# Patient Record
Sex: FEMALE | Race: BLACK OR AFRICAN AMERICAN | Employment: OTHER | URBAN - METROPOLITAN AREA
[De-identification: names, ages, dates, MRNs, and addresses within clinical notes are randomized per-mention and may not be internally consistent; named-entity substitution may affect disease eponyms.]

---

## 2022-04-30 ENCOUNTER — HOSPITAL ENCOUNTER (OUTPATIENT)
Age: 67
Setting detail: OBSERVATION
Discharge: HOME | End: 2022-05-01
Attending: STUDENT IN AN ORGANIZED HEALTH CARE EDUCATION/TRAINING PROGRAM | Admitting: HOSPITALIST
Payer: MEDICARE

## 2022-04-30 DIAGNOSIS — T78.3XXA ANGIOEDEMA, INITIAL ENCOUNTER: Primary | ICD-10-CM

## 2022-04-30 LAB
ANION GAP SERPL CALC-SCNC: 8 MMOL/L (ref 3–18)
BASOPHILS # BLD: 0 K/UL (ref 0–0.1)
BASOPHILS NFR BLD: 0 % (ref 0–2)
BUN SERPL-MCNC: 12 MG/DL (ref 7–18)
BUN/CREAT SERPL: 13 (ref 12–20)
CALCIUM SERPL-MCNC: 9.1 MG/DL (ref 8.5–10.1)
CHLORIDE SERPL-SCNC: 108 MMOL/L (ref 100–111)
CO2 SERPL-SCNC: 28 MMOL/L (ref 21–32)
CREAT SERPL-MCNC: 0.9 MG/DL (ref 0.6–1.3)
DIFFERENTIAL METHOD BLD: NORMAL
EOSINOPHIL # BLD: 0 K/UL (ref 0–0.4)
EOSINOPHIL NFR BLD: 0 % (ref 0–5)
ERYTHROCYTE [DISTWIDTH] IN BLOOD BY AUTOMATED COUNT: 13.6 % (ref 11.6–14.5)
GLUCOSE SERPL-MCNC: 108 MG/DL (ref 74–99)
HCT VFR BLD AUTO: 41.4 % (ref 35–45)
HGB BLD-MCNC: 13 G/DL (ref 12–16)
IMM GRANULOCYTES # BLD AUTO: 0 K/UL (ref 0–0.04)
IMM GRANULOCYTES NFR BLD AUTO: 0 % (ref 0–0.5)
LYMPHOCYTES # BLD: 2.1 K/UL (ref 0.9–3.6)
LYMPHOCYTES NFR BLD: 33 % (ref 21–52)
MAGNESIUM SERPL-MCNC: 2.2 MG/DL (ref 1.6–2.6)
MCH RBC QN AUTO: 29.3 PG (ref 24–34)
MCHC RBC AUTO-ENTMCNC: 31.4 G/DL (ref 31–37)
MCV RBC AUTO: 93.2 FL (ref 78–100)
MONOCYTES # BLD: 0.5 K/UL (ref 0.05–1.2)
MONOCYTES NFR BLD: 8 % (ref 3–10)
NEUTS SEG # BLD: 3.7 K/UL (ref 1.8–8)
NEUTS SEG NFR BLD: 59 % (ref 40–73)
NRBC # BLD: 0 K/UL (ref 0–0.01)
NRBC BLD-RTO: 0 PER 100 WBC
PLATELET # BLD AUTO: 195 K/UL (ref 135–420)
PMV BLD AUTO: 11.6 FL (ref 9.2–11.8)
POTASSIUM SERPL-SCNC: 3.5 MMOL/L (ref 3.5–5.5)
RBC # BLD AUTO: 4.44 M/UL (ref 4.2–5.3)
SODIUM SERPL-SCNC: 144 MMOL/L (ref 136–145)
WBC # BLD AUTO: 6.4 K/UL (ref 4.6–13.2)

## 2022-04-30 PROCEDURE — 74011000250 HC RX REV CODE- 250: Performed by: HOSPITALIST

## 2022-04-30 PROCEDURE — 80048 BASIC METABOLIC PNL TOTAL CA: CPT

## 2022-04-30 PROCEDURE — 65390000012 HC CONDITION CODE 44 OBSERVATION

## 2022-04-30 PROCEDURE — 74011250636 HC RX REV CODE- 250/636: Performed by: EMERGENCY MEDICINE

## 2022-04-30 PROCEDURE — 99285 EMERGENCY DEPT VISIT HI MDM: CPT

## 2022-04-30 PROCEDURE — 74011250636 HC RX REV CODE- 250/636: Performed by: STUDENT IN AN ORGANIZED HEALTH CARE EDUCATION/TRAINING PROGRAM

## 2022-04-30 PROCEDURE — 99219 PR INITIAL OBSERVATION CARE/DAY 50 MINUTES: CPT | Performed by: HOSPITALIST

## 2022-04-30 PROCEDURE — 96374 THER/PROPH/DIAG INJ IV PUSH: CPT

## 2022-04-30 PROCEDURE — 74011000250 HC RX REV CODE- 250: Performed by: STUDENT IN AN ORGANIZED HEALTH CARE EDUCATION/TRAINING PROGRAM

## 2022-04-30 PROCEDURE — 83735 ASSAY OF MAGNESIUM: CPT

## 2022-04-30 PROCEDURE — G0378 HOSPITAL OBSERVATION PER HR: HCPCS

## 2022-04-30 PROCEDURE — 74011000258 HC RX REV CODE- 258: Performed by: STUDENT IN AN ORGANIZED HEALTH CARE EDUCATION/TRAINING PROGRAM

## 2022-04-30 PROCEDURE — 74011250636 HC RX REV CODE- 250/636: Performed by: OTOLARYNGOLOGY

## 2022-04-30 PROCEDURE — 85025 COMPLETE CBC W/AUTO DIFF WBC: CPT

## 2022-04-30 PROCEDURE — 96375 TX/PRO/DX INJ NEW DRUG ADDON: CPT

## 2022-04-30 RX ORDER — DEXTROSE, SODIUM CHLORIDE, AND POTASSIUM CHLORIDE 5; .9; .15 G/100ML; G/100ML; G/100ML
75 INJECTION INTRAVENOUS CONTINUOUS
Status: DISCONTINUED | OUTPATIENT
Start: 2022-04-30 | End: 2022-05-01 | Stop reason: HOSPADM

## 2022-04-30 RX ORDER — ONDANSETRON 2 MG/ML
4 INJECTION INTRAMUSCULAR; INTRAVENOUS
Status: DISCONTINUED | OUTPATIENT
Start: 2022-04-30 | End: 2022-05-01 | Stop reason: HOSPADM

## 2022-04-30 RX ORDER — ONDANSETRON 8 MG/1
4 TABLET, ORALLY DISINTEGRATING ORAL
Status: DISCONTINUED | OUTPATIENT
Start: 2022-04-30 | End: 2022-05-01 | Stop reason: HOSPADM

## 2022-04-30 RX ORDER — ACETAMINOPHEN 325 MG/1
650 TABLET ORAL
Status: DISCONTINUED | OUTPATIENT
Start: 2022-04-30 | End: 2022-05-01 | Stop reason: HOSPADM

## 2022-04-30 RX ORDER — POLYETHYLENE GLYCOL 3350 17 G/17G
17 POWDER, FOR SOLUTION ORAL DAILY PRN
Status: DISCONTINUED | OUTPATIENT
Start: 2022-04-30 | End: 2022-05-01 | Stop reason: HOSPADM

## 2022-04-30 RX ORDER — SODIUM CHLORIDE 0.9 % (FLUSH) 0.9 %
5-40 SYRINGE (ML) INJECTION EVERY 8 HOURS
Status: DISCONTINUED | OUTPATIENT
Start: 2022-04-30 | End: 2022-05-01 | Stop reason: HOSPADM

## 2022-04-30 RX ORDER — SODIUM CHLORIDE 0.9 % (FLUSH) 0.9 %
5-40 SYRINGE (ML) INJECTION AS NEEDED
Status: DISCONTINUED | OUTPATIENT
Start: 2022-04-30 | End: 2022-05-01 | Stop reason: HOSPADM

## 2022-04-30 RX ORDER — FAMOTIDINE 10 MG/ML
20 INJECTION INTRAVENOUS DAILY
Status: DISCONTINUED | OUTPATIENT
Start: 2022-05-01 | End: 2022-05-01 | Stop reason: HOSPADM

## 2022-04-30 RX ORDER — DEXAMETHASONE SODIUM PHOSPHATE 4 MG/ML
10 INJECTION, SOLUTION INTRA-ARTICULAR; INTRALESIONAL; INTRAMUSCULAR; INTRAVENOUS; SOFT TISSUE EVERY 6 HOURS
Status: DISCONTINUED | OUTPATIENT
Start: 2022-04-30 | End: 2022-05-01

## 2022-04-30 RX ORDER — ACETAMINOPHEN 650 MG/1
650 SUPPOSITORY RECTAL
Status: DISCONTINUED | OUTPATIENT
Start: 2022-04-30 | End: 2022-05-01 | Stop reason: HOSPADM

## 2022-04-30 RX ORDER — DIPHENHYDRAMINE HYDROCHLORIDE 50 MG/ML
25 INJECTION, SOLUTION INTRAMUSCULAR; INTRAVENOUS
Status: DISCONTINUED | OUTPATIENT
Start: 2022-04-30 | End: 2022-05-01 | Stop reason: HOSPADM

## 2022-04-30 RX ADMIN — TRANEXAMIC ACID 1000 MG: 1 INJECTION, SOLUTION INTRAVENOUS at 12:15

## 2022-04-30 RX ADMIN — POTASSIUM CHLORIDE, DEXTROSE MONOHYDRATE AND SODIUM CHLORIDE 75 ML/HR: 150; 5; 900 INJECTION, SOLUTION INTRAVENOUS at 20:52

## 2022-04-30 RX ADMIN — SODIUM CHLORIDE, PRESERVATIVE FREE 10 ML: 5 INJECTION INTRAVENOUS at 22:08

## 2022-04-30 RX ADMIN — FAMOTIDINE 20 MG: 10 INJECTION INTRAVENOUS at 12:05

## 2022-04-30 RX ADMIN — DEXAMETHASONE SODIUM PHOSPHATE 10 MG: 4 INJECTION, SOLUTION INTRAMUSCULAR; INTRAVENOUS at 18:18

## 2022-04-30 NOTE — ED NOTES
Informed about patient arriving to the ED from [facility] by Dr. Keegan Ewing . Dr. Keegan Ewing  received report by Dr. Sophie Gore about patient having an allergic reaction after consuming shrimp yesterday. Patient received epinephrine, Benadryl, [facility] Received tranexamic acid, received Pepcid. Patient states that she ate shrimp yesterday night and she woke up today with difficulty swallowing, tongue swelling. Patient feels better compared to this morning. Reports there is moderate tongue swelling noted not able to visualize the uvula, submental swelling. No drooling.   No wheezing  1:56 PM  Reassessed   Patient not in distress, no drooling, tongue swelling persists, unable to visualize uvula

## 2022-04-30 NOTE — ED NOTES
[facility] at bedside preparing patient for transport to [facility] ED. All patient belongings transported with patient at this time. Tranexamic acid still infusing at time of transport. bilateral LE Active ROM was WNL (within normal limits)

## 2022-04-30 NOTE — ED PROVIDER NOTES
EMERGENCY DEPARTMENT HISTORY AND PHYSICAL EXAM      Date: 4/30/2022  Patient Name: Mayelin Powell    History of Presenting Illness     Chief Complaint   Patient presents with    Allergic Reaction         History Provided By: patient     Chief Complaint: tongue swelling  Duration: 4 hours  Timing: started at 9AM  Location: tongue    Severity: moderate  Modifying Factors: thinks exposure to shrimp the preceding evening  Associated Symptoms: none      History (Context): Mayelin Powell is a 75 y.o. female with a past medical history significant for HTN, recent episode of angioedema 1 month ago that she attributed to strep exposure, comes into the ED today due to acute onset angioedema that started this morning at 9 AM.  She reports having had a strep test yesterday evening. No symptoms at that time. No known triggers before the angioedema started today. Reports swelling in her tongue with no other associated structures. No associated nausea/vomiting, abdominal pain, diarrhea, dizziness/lightheadedness, hives, rash. Went to urgent care and received Solu-Medrol, Benadryl, epinephrine. She was then transported to [facility] where she received TXA and Pepcid. Presents to [facility] emergency department for further management. Currently denies headache, vision changes, hearing loss, abdominal pain, nausea/vomiting, lightheadedness. Review of systems otherwise negative. PCP: Rehana Whitehead MD    Current Outpatient Medications   Medication Sig Dispense Refill    hydroCHLOROthiazide (MICROZIDE) 12.5 mg capsule Take 12.5 mg by mouth daily.          Past History     Past Medical History:   Past Medical History:   Diagnosis Date    Hyperlipemia     Hypertension     Vitamin D deficiency        Past Surgical History:  Past Surgical History:   Procedure Laterality Date    HX BACK SURGERY      Left L5-S1 minimally invasive diskectomy/decompression    HX HYSTERECTOMY      HX ORTHOPAEDIC Right     Jing Anabela Quervain's tenosynovitis release       Family History:  History reviewed. No pertinent family history. Social History:   Social History     Tobacco Use    Smoking status: Never Smoker    Smokeless tobacco: Never Used   Vaping Use    Vaping Use: Never used   Substance Use Topics    Alcohol use: Yes     Comment: social     Drug use: No       Allergies: Allergies   Allergen Reactions    Penicillins Swelling     Arm swelling       PMH, PSH, family history, social history, allergies reviewed with the patient with significant items noted above. Review of Systems   Review of Systems   Constitutional: Negative for chills and fever. HENT: Negative for hearing loss, rhinorrhea and sore throat. Lingual swelling   Eyes: Negative for visual disturbance. Respiratory: Negative for shortness of breath. Cardiovascular: Negative for chest pain. Gastrointestinal: Negative for abdominal pain, nausea and vomiting. Genitourinary: Negative for difficulty urinating, dysuria and hematuria. Musculoskeletal: Negative for myalgias and neck pain. Skin: Negative for rash. Neurological: Negative for dizziness, syncope, light-headedness and headaches. Physical Exam     Vitals:    04/30/22 1257 04/30/22 1330 04/30/22 1345 04/30/22 1545   BP: (!) 142/78 134/78 (!) 141/77 (!) 180/94   Pulse:  79 78 95   Resp: 18 19 20 (!) 43   Temp: 97.8 °F (36.6 °C)      SpO2: 100% 94% 94% 90%   Weight:       Height:           Physical Exam  Vitals and nursing note reviewed. Constitutional:       General: She is not in acute distress. Appearance: Normal appearance. She is obese. She is not ill-appearing, toxic-appearing or diaphoretic. HENT:      Head: Normocephalic and atraumatic. Right Ear: External ear normal.      Left Ear: External ear normal.      Nose: Nose normal.      Mouth/Throat:      Mouth: Mucous membranes are moist.      Comments: Swollen tongue, patent airway.   Unable to appreciate the uvula or oropharynx  Eyes:      Extraocular Movements: Extraocular movements intact. Cardiovascular:      Rate and Rhythm: Normal rate and regular rhythm. Pulses: Normal pulses. Heart sounds: Normal heart sounds. No murmur heard. No gallop. Pulmonary:      Effort: Pulmonary effort is normal. No respiratory distress. Breath sounds: Normal breath sounds. No stridor. No wheezing, rhonchi or rales. Abdominal:      General: Abdomen is flat. Bowel sounds are normal.      Palpations: Abdomen is soft. Tenderness: There is no abdominal tenderness. There is no guarding or rebound. Musculoskeletal:         General: No swelling or tenderness. Cervical back: Neck supple. Right lower leg: No edema. Left lower leg: No edema. Skin:     General: Skin is warm and dry. Capillary Refill: Capillary refill takes less than 2 seconds. Findings: No lesion or rash. Neurological:      General: No focal deficit present. Mental Status: She is alert and oriented to person, place, and time. Mental status is at baseline. Psychiatric:         Mood and Affect: Mood normal.         Diagnostic Study Results     Labs -     Recent Results (from the past 12 hour(s))   CBC WITH AUTOMATED DIFF    Collection Time: 04/30/22 12:03 PM   Result Value Ref Range    WBC 6.4 4.6 - 13.2 K/uL    RBC 4.44 4.20 - 5.30 M/uL    HGB 13.0 12.0 - 16.0 g/dL    HCT 41.4 35.0 - 45.0 %    MCV 93.2 78.0 - 100.0 FL    MCH 29.3 24.0 - 34.0 PG    MCHC 31.4 31.0 - 37.0 g/dL    RDW 13.6 11.6 - 14.5 %    PLATELET 901 883 - 826 K/uL    MPV 11.6 9.2 - 11.8 FL    NRBC 0.0 0  WBC    ABSOLUTE NRBC 0.00 0.00 - 0.01 K/uL    NEUTROPHILS 59 40 - 73 %    LYMPHOCYTES 33 21 - 52 %    MONOCYTES 8 3 - 10 %    EOSINOPHILS 0 0 - 5 %    BASOPHILS 0 0 - 2 %    IMMATURE GRANULOCYTES 0 0.0 - 0.5 %    ABS. NEUTROPHILS 3.7 1.8 - 8.0 K/UL    ABS. LYMPHOCYTES 2.1 0.9 - 3.6 K/UL    ABS. MONOCYTES 0.5 0.05 - 1.2 K/UL    ABS.  EOSINOPHILS 0.0 0.0 - 0.4 K/UL    ABS. BASOPHILS 0.0 0.0 - 0.1 K/UL    ABS. IMM. GRANS. 0.0 0.00 - 0.04 K/UL    DF AUTOMATED     METABOLIC PANEL, BASIC    Collection Time: 04/30/22 12:03 PM   Result Value Ref Range    Sodium 144 136 - 145 mmol/L    Potassium 3.5 3.5 - 5.5 mmol/L    Chloride 108 100 - 111 mmol/L    CO2 28 21 - 32 mmol/L    Anion gap 8 3.0 - 18 mmol/L    Glucose 108 (H) 74 - 99 mg/dL    BUN 12 7.0 - 18 MG/DL    Creatinine 0.90 0.6 - 1.3 MG/DL    BUN/Creatinine ratio 13 12 - 20      GFR est AA >60 >60 ml/min/1.73m2    GFR est non-AA >60 >60 ml/min/1.73m2    Calcium 9.1 8.5 - 10.1 MG/DL   MAGNESIUM    Collection Time: 04/30/22 12:03 PM   Result Value Ref Range    Magnesium 2.2 1.6 - 2.6 mg/dL      Labs Reviewed   METABOLIC PANEL, BASIC - Abnormal; Notable for the following components:       Result Value    Glucose 108 (*)     All other components within normal limits   CBC WITH AUTOMATED DIFF   MAGNESIUM       Radiologic Studies -   No orders to display     CT Results  (Last 48 hours)    None        CXR Results  (Last 48 hours)    None          The laboratory results, imaging results, and other diagnostic exams were reviewed in the EMR. Medical Decision Making   I am the first provider for this patient. I reviewed the vital signs, available nursing notes, past medical history, past surgical history, family history and social history. Vital Signs-Reviewed the patient's vital signs. Records Reviewed: Personally, on initial evaluation    MDM:   Brynn Drivers presents with complaint of angioedema  DDX includes but is not limited to: Anaphylaxis, angioedema with multiple etiologies (hereditary, acquired, ACE-i induced), Johnny's angina, PTA    Patient presents in no acute distress. She is maintaining her airway and has no audible stridor. She reports that the swelling seems improved compared to before.   Of note, ICU was contacted prior to patient's arrival.  Plan is for  assessment and further recommendations from ENT, Dr. Stephany Tucker. Dr. Stephany Tucker assessed the patient with nasopharyngeal scope. At this time, patient continues to have some swelling in the oropharynx. He recommended continued observation in the inpatient setting. Per his recommendation, ICU was not necessary but patient may benefit from stepdown disposition. Case discussed with hospitalist, Dr. Summer Luciano, for continued observation. Patient evaluated by hospitalist and admitted to stepdown for continued airway watch. Patient was stable upon admission to SDU      Orders as below:  Orders Placed This Encounter    CBC WITH AUTOMATED DIFF    BASIC METABOLIC PANEL    MAGNESIUM    CARDIAC MONITORING    SALINE LOCK IV ONE TIME STAT    famotidine (PF) (PEPCID) 20 mg in 0.9% sodium chloride 10 mL injection    tranexamic acid (CYKLOKAPRON) 1,000 mg in 0.9% sodium chloride (MBP/ADV) 110 mL MBP    INITIAL PHYSICIAN ORDER: INPATIENT Stepdown; Yes; 3. Patient receiving treatment that can only be provided in an inpatient setting (further clarification in H&P documentation)        Procedures:  Procedures        Diagnosis and Disposition     CLINICAL IMPRESSION:  1. Angioedema, initial encounter      Current Discharge Medication List          Disposition: admit - SDU    Patient condition at time of disposition: stable        Dragon Disclaimer     Please note that this dictation was completed with Launch[employer]ram, the computer voice recognition software. Quite often unanticipated grammatical, syntax, homophones, and other interpretive errors are inadvertently transcribed by the computer software. Please disregard these errors. Please excuse any errors that have escaped final proofreading.       Savannah Feliciano MD

## 2022-04-30 NOTE — ED TRIAGE NOTES
EMS states \"patient ate shrimp last night and she woke up this morning and her tongue was swollen\". EMS states \"[facility]ient [facility] and [facility] benadryl IM, Epi 1 mg, Solu-medrol 125 IV by IV.

## 2022-04-30 NOTE — ED NOTES
TRANSFER - OUT REPORT:    Verbal report given to [address] [place] [address] on Elisa Rebolledo  being transferred to [facility] ED for routine progression of care       Report consisted of patients Situation, Background, Assessment and   Recommendations(SBAR). Information from the following report(s) SBAR, ED Summary and MAR was reviewed with the receiving nurse. Lines:   Peripheral IV 04/30/22 Right Hand (Active)   Site Assessment Clean, dry, & intact 04/30/22 1155   Phlebitis Assessment 0 04/30/22 1155   Infiltration Assessment 0 04/30/22 1155   Dressing Status Clean, dry, & intact 04/30/22 1155   Dressing Type Transparent 04/30/22 1155   Hub Color/Line Status Pink;Patent; Flushed 04/30/22 1155       Peripheral IV 04/30/22 Left Hand (Active)   Site Assessment Clean, dry, & intact 04/30/22 1204   Phlebitis Assessment 0 04/30/22 1204   Infiltration Assessment 0 04/30/22 1204   Dressing Status Clean, dry, & intact 04/30/22 1204   Dressing Type Transparent 04/30/22 1204   Hub Color/Line Status Blue;Patent; Flushed 04/30/22 1204        Opportunity for questions and clarification was provided.

## 2022-04-30 NOTE — Clinical Note
Status[de-identified] INPATIENT [101]   Type of Bed: Stepdown [17]   Cardiac Monitoring Required?: Yes   Inpatient Hospitalization Certified Necessary for the Following Reasons: 3.  Patient receiving treatment that can only be provided in an inpatient setting (further clarification in H&P documentation)   Admitting Diagnosis: Angioedema [269603]   Admitting Physician: Caleb Collazo [[id number]]   Attending Physician: Caleb Collazo [[id number]]   Estimated Length of Stay: 2 Midnights   Discharge Plan[de-identified] Home with Office Follow-up

## 2022-04-30 NOTE — H&P
HISTORY & PHYSICAL      Patient: Catrachito Brooke MRN: [id number]  [facility]: [id number]    YOB: 1955  Age: 75 y.o. Sex: female    DOA: 4/30/2022 LOS:  LOS: 1 day        DOA: 4/30/2022        Assessment/Plan     Active Problems:    Angioedema (4/30/2022)        Patient Active Problem List   Diagnosis Code    Angioedema T78. 3XXA         Plan:  1. Angioedema secondary to shrimp allergy -patient mentions she had shrimp and crept up yesterday. IV steroids, IV Pepcid, IV Benadryl, patient evaluated by ENT and recommended overnight observation. 2. History of hypertension -holding HCTZ, can restart on discharge. DVT prophylaxisSCDs  Full code              HPI:     Catrachito Brooke is a 75 y.o. female who has history of hypertension, shrimp allergy, presents to the emergency room after having shrimp and crab dip yesterday. Patient mentions that she forgot that she was allergic. She noticed her tongue swelling as well as difficulty swallowing early this morning so came to the emergency room for further evaluation. She does not have an EpiPen at home. ER evaluationpatient noted to be short of breath, started on IV steroids, IV Benadryl as well as IV Pepcid. Patient evaluated by ENT in the ER and recommended overnight observation. Past Medical History:   Diagnosis Date    Hyperlipemia     Hypertension     Vitamin D deficiency        Past Surgical History:   Procedure Laterality Date    HX BACK SURGERY      Left L5-S1 minimally invasive diskectomy/decompression    HX HYSTERECTOMY      HX ORTHOPAEDIC Right     De Quervain's tenosynovitis release       History reviewed. No pertinent family history.     Social History     Socioeconomic History    Marital status: SINGLE   Tobacco Use    Smoking status: Never Smoker    Smokeless tobacco: Never Used   Vaping Use    Vaping Use: Never used   Substance and Sexual Activity    Alcohol use: Yes     Comment: social     Drug use: No    Sexual activity: Not Currently       Prior to Admission medications    Medication Sig Start Date End Date Taking? Authorizing Provider   hydroCHLOROthiazide (MICROZIDE) 12.5 mg capsule Take 12.5 mg by mouth daily. Yes Other, MD Julia       Allergies   Allergen Reactions    Penicillins Swelling     Arm swelling       Review of Systems:    Pertinent Positives noted in HPI. Rest all other ROS were noted to be negative. Physical Exam:      Visit Vitals  BP (!) 180/94   Pulse 95   Temp 97.8 °F (36.6 °C)   Resp (!) 43   Ht 5' 2\" (1.575 m)   Wt 87.5 kg (193 lb)   SpO2 90%   BMI 35.30 kg/m²       Physical Exam:    Gen: In general, this is a well nourished female in no acute distress  HEENT: Sclerae nonicteric. Oral mucous membranes moist. Dentition normal, tongue does not appear to be swollen at this time. Patient does not have any airway compromise. Neck: Supple with midline trachea. CV: RRR without murmur or rub appreciated. Resp:Respirations are unlabored without use of accessory muscles. Lung fields B/L without wheezes or rhonchi. Abd: Soft, nontender, nondistended. Extrem: Extremities are warm, without cyanosis or clubbing. No pitting pretibial edema. Skin: Warm, no visible rashes. Neuro: Patient is alert, oriented, and cooperative. No obvious focal defects. Moves all 4 extremities.     Labs Reviewed:    Recent Results (from the past 24 hour(s))   CBC WITH AUTOMATED DIFF    Collection Time: 04/30/22 12:03 PM   Result Value Ref Range    WBC 6.4 4.6 - 13.2 K/uL    RBC 4.44 4.20 - 5.30 M/uL    HGB 13.0 12.0 - 16.0 g/dL    HCT 41.4 35.0 - 45.0 %    MCV 93.2 78.0 - 100.0 FL    MCH 29.3 24.0 - 34.0 PG    MCHC 31.4 31.0 - 37.0 g/dL    RDW 13.6 11.6 - 14.5 %    PLATELET 373 197 - 249 K/uL    MPV 11.6 9.2 - 11.8 FL    NRBC 0.0 0  WBC    ABSOLUTE NRBC 0.00 0.00 - 0.01 K/uL    NEUTROPHILS 59 40 - 73 %    LYMPHOCYTES 33 21 - 52 %    MONOCYTES 8 3 - 10 %    EOSINOPHILS 0 0 - 5 %    BASOPHILS 0 0 - 2 %    IMMATURE GRANULOCYTES 0 0.0 - 0.5 %    ABS. NEUTROPHILS 3.7 1.8 - 8.0 K/UL    ABS. LYMPHOCYTES 2.1 0.9 - 3.6 K/UL    ABS. MONOCYTES 0.5 0.05 - 1.2 K/UL    ABS. EOSINOPHILS 0.0 0.0 - 0.4 K/UL    ABS. BASOPHILS 0.0 0.0 - 0.1 K/UL    ABS. IMM. GRANS. 0.0 0.00 - 0.04 K/UL    DF AUTOMATED     METABOLIC PANEL, BASIC    Collection Time: 04/30/22 12:03 PM   Result Value Ref Range    Sodium 144 136 - 145 mmol/L    Potassium 3.5 3.5 - 5.5 mmol/L    Chloride 108 100 - 111 mmol/L    CO2 28 21 - 32 mmol/L    Anion gap 8 3.0 - 18 mmol/L    Glucose 108 (H) 74 - 99 mg/dL    BUN 12 7.0 - 18 MG/DL    Creatinine 0.90 0.6 - 1.3 MG/DL    BUN/Creatinine ratio 13 12 - 20      GFR est AA >60 >60 ml/min/1.73m2    GFR est non-AA >60 >60 ml/min/1.73m2    Calcium 9.1 8.5 - 10.1 MG/DL   MAGNESIUM    Collection Time: 04/30/22 12:03 PM   Result Value Ref Range    Magnesium 2.2 1.6 - 2.6 mg/dL       Imaging Reviewed:    XR Results (most recent):  Results from Hospital Encounter encounter on 12/16/20    XR CHEST SNGL V    Narrative  Exam: AP portable chest    Clinical indication: Cough, Covid 19 exposure    Comparison: 11/10/2010; Results:  No consolidation. No pleural effusions. No lung  masses. No pneumothorax. Heart mildly enlarged. Mediastinal contours are normal.    No free air is seen under the hemidiaphragms. Osseous structures intact. Impression  IMPRESSION:  No acute disease. CT Results (most recent):  No results found for this or any previous visit. Yossi Vogel MD  4/30/2022, 5:47 PM        Disclaimer: Sections of this note are dictated using utilizing voice recognition software. Minor typographical errors may be present. If questions arise, please do not hesitate to contact me or call our department.

## 2022-04-30 NOTE — ED NOTES
Pt arrived to ED from [facility] via [facility] for angioedema / allergic reaction. Pt ate a  shrimp last night. Hx of allergy. This morning she has facial swelling, [facility] treated with Epi, benadryl, and solumedrol. She was then sent to [facility] where she received Pepcid and TXA transfusion- completed in route. Alert and appropriate on arrival. Vitals stable. Reports improvement in swelling.  ENT Amina Casas aware and to see per transfer team. Attending Attestation (For Attendings USE Only)...

## 2022-04-30 NOTE — ED PROVIDER NOTES
Patient presents with swelling to her tongue and lower face. Patient did have a piece of shrimp last night. She has a known history of allergies to shellfish and states that she has had swelling that was more mild in the past after eating some sort of seafood. She does not have any symptoms last night however this morning when she woke up she noticed that her tongue and face was swollen. This occurred around 9 AM.  [facility]  During the time between [facility] up and [facility] she did not have any worsening of her symptoms. [facility] she was given epinephrine, methylprednisone and Benadryl and states that she feels like the swelling has improved. She is having trouble swallowing and does feel like she is drooling but denies any difficulty breathing. Has never had bad swelling like this before. Denies any changes in her medications. Does not take lisinopril. Past Medical History:   Diagnosis Date    Hyperlipemia     Hypertension     Vitamin D deficiency        Past Surgical History:   Procedure Laterality Date    HX BACK SURGERY      Left L5-S1 minimally invasive diskectomy/decompression    HX HYSTERECTOMY      HX ORTHOPAEDIC Right     De Quervain's tenosynovitis release         History reviewed. No pertinent family history.     Social History     Socioeconomic History    Marital status: SINGLE     Spouse name: Not on file    Number of children: Not on file    Years of education: Not on file    Highest education level: Not on file   Occupational History    Not on file   Tobacco Use    Smoking status: Never Smoker    Smokeless tobacco: Never Used   Vaping Use    Vaping Use: Never used   Substance and Sexual Activity    Alcohol use: Yes     Comment: social     Drug use: No    Sexual activity: Not Currently   Other Topics Concern    Not on file   Social History Narrative    Not on file     Social Determinants of Health     Financial Resource Strain:     Difficulty of Paying Living Expenses: Not on file   Food Insecurity:     Worried About Running Out of Food in the Last Year: Not on file    Crystal of Food in the Last Year: Not on file   Transportation Needs:     Lack of Transportation (Medical): Not on file    Lack of Transportation (Non-Medical):  Not on file   Physical Activity:     Days of Exercise per Week: Not on file    Minutes of Exercise per Session: Not on file   Stress:     Feeling of Stress : Not on file   Social Connections:     Frequency of Communication with Friends and Family: Not on file    Frequency of Social Gatherings with Friends and Family: Not on file    Attends Sabianist Services: Not on file    Active Member of Clubs or Organizations: Not on file    Attends Club or Organization Meetings: Not on file    Marital Status: Not on file   Intimate Partner Violence:     Fear of Current or Ex-Partner: Not on file    Emotionally Abused: Not on file    Physically Abused: Not on file    Sexually Abused: Not on file   Housing Stability:     Unable to Pay for Housing in the Last Year: Not on file    Number of [place] in the Last Year: Not on file    Unstable Housing in the Last Year: Not on file         ALLERGIES: Penicillins    Review of Systems  Constitutional: No fever  HENT: No ear pain  Eyes: No change in vision  Respiratory: No SOB  Cardio: No chest pain  GI: No blood in stool  : No hematuria  MSK: No back pain  Skin: No rashes  Neuro: No headache    Vitals:    04/30/22 1146 04/30/22 1154   BP: (!) 143/76    Pulse: 66    Resp: 20    Temp: 98.7 °F (37.1 °C)    SpO2: 100% 100%   Weight: 87.5 kg (193 lb)    Height: 5' 2\" (1.575 m)             Physical Exam   General: No acute distress  Head: Normocephalic, atraumatic  Psych: Cooperative and alert  Eyes: No scleral icterus, normal conjunctiva  ENT: Enlarged tongue, swelling noted under the tongue, swelling noted under the chin, no posterior pharynx swelling, no stridor, no active drooling  Neck: Obvious swelling noted under the chin  CV: Regular rate and rhythm, no pitting edema, palpable radial pulses  Pulm: Clear breath sounds bilaterally without any wheezing or rhonchi, normal respiratory rate  GI: Normal bowel sounds, soft, non-tender  MSK: Moves all four extremities  Skin: No rashes  Neuro: Alert and conversive    MDM   Patient is a 48-year-old female who presents with angioedema. Angioedema is quite extensive however no signs of airway obstruction at this time. Patient states that she improved after the medications that [facility]  She was given 1 mg of epinephrine, 125 mg of Methylpred and 50 mg of Benadryl at 10:00. Patient states that she feels like she is clinically improved. I have decided not see her at that time but her angioedema is still quite extensive. We will give her a dose of famotidine and TXA here however based on the patient's presentation and history this is mostly caused by the insurance that she ate last night. Patient will require close airway monitoring. Patient will need to be admitted to the ICU. Since we are freestanding ED and intubation would be very difficult in this patient I do feel that she would be better served being at [facility] where there is more backup. Therefore we will arrange ED to ED transportation versus calling ICU speech is faster. Did speak with Dr. Marika Michel with ENT to get him on board. He will evaluate the patient at [facility]. Spoke with Dr. Judi Palm at [facility] emergency department who will accept the patient. Spoke with [facility]  To advise him that the patient will be coming there. Discussed with the patient the complications that can proceed moving forward. Discussed that she needs to tell us right away if she starts having any difficulty breathing or any worsening of her symptoms.   Discussed the possibility of intubation or surgical airway if this worsens. Patient is in agreement the plan to be transferred to [facility] at this time.     Procedures

## 2022-05-01 VITALS
WEIGHT: 196 LBS | BODY MASS INDEX: 36.07 KG/M2 | HEART RATE: 70 BPM | TEMPERATURE: 98.7 F | SYSTOLIC BLOOD PRESSURE: 152 MMHG | DIASTOLIC BLOOD PRESSURE: 78 MMHG | HEIGHT: 62 IN | OXYGEN SATURATION: 98 % | RESPIRATION RATE: 18 BRPM

## 2022-05-01 LAB
ALBUMIN SERPL-MCNC: 3.3 G/DL (ref 3.4–5)
ALBUMIN/GLOB SERPL: 0.9 {RATIO} (ref 0.8–1.7)
ALP SERPL-CCNC: 68 U/L (ref 45–117)
ALT SERPL-CCNC: 17 U/L (ref 13–56)
ANION GAP SERPL CALC-SCNC: 6 MMOL/L (ref 3–18)
AST SERPL-CCNC: 9 U/L (ref 10–38)
BASOPHILS # BLD: 0 K/UL (ref 0–0.1)
BASOPHILS NFR BLD: 0 % (ref 0–2)
BILIRUB SERPL-MCNC: 0.4 MG/DL (ref 0.2–1)
BUN SERPL-MCNC: 15 MG/DL (ref 7–18)
BUN/CREAT SERPL: 18 (ref 12–20)
CALCIUM SERPL-MCNC: 8.8 MG/DL (ref 8.5–10.1)
CHLORIDE SERPL-SCNC: 111 MMOL/L (ref 100–111)
CO2 SERPL-SCNC: 23 MMOL/L (ref 21–32)
CREAT SERPL-MCNC: 0.82 MG/DL (ref 0.6–1.3)
DIFFERENTIAL METHOD BLD: ABNORMAL
EOSINOPHIL # BLD: 0 K/UL (ref 0–0.4)
EOSINOPHIL NFR BLD: 0 % (ref 0–5)
ERYTHROCYTE [DISTWIDTH] IN BLOOD BY AUTOMATED COUNT: 13.3 % (ref 11.6–14.5)
GLOBULIN SER CALC-MCNC: 3.8 G/DL (ref 2–4)
GLUCOSE SERPL-MCNC: 178 MG/DL (ref 74–99)
HCT VFR BLD AUTO: 38.8 % (ref 35–45)
HGB BLD-MCNC: 12.6 G/DL (ref 12–16)
IMM GRANULOCYTES # BLD AUTO: 0.1 K/UL (ref 0–0.04)
IMM GRANULOCYTES NFR BLD AUTO: 1 % (ref 0–0.5)
LYMPHOCYTES # BLD: 1.2 K/UL (ref 0.9–3.6)
LYMPHOCYTES NFR BLD: 9 % (ref 21–52)
MCH RBC QN AUTO: 29 PG (ref 24–34)
MCHC RBC AUTO-ENTMCNC: 32.5 G/DL (ref 31–37)
MCV RBC AUTO: 89.2 FL (ref 78–100)
MONOCYTES # BLD: 0.3 K/UL (ref 0.05–1.2)
MONOCYTES NFR BLD: 2 % (ref 3–10)
NEUTS SEG # BLD: 12 K/UL (ref 1.8–8)
NEUTS SEG NFR BLD: 89 % (ref 40–73)
NRBC # BLD: 0 K/UL (ref 0–0.01)
NRBC BLD-RTO: 0 PER 100 WBC
PLATELET # BLD AUTO: 181 K/UL (ref 135–420)
PMV BLD AUTO: 11.8 FL (ref 9.2–11.8)
POTASSIUM SERPL-SCNC: 3.8 MMOL/L (ref 3.5–5.5)
PROT SERPL-MCNC: 7.1 G/DL (ref 6.4–8.2)
RBC # BLD AUTO: 4.35 M/UL (ref 4.2–5.3)
SODIUM SERPL-SCNC: 140 MMOL/L (ref 136–145)
WBC # BLD AUTO: 13.5 K/UL (ref 4.6–13.2)

## 2022-05-01 PROCEDURE — G0378 HOSPITAL OBSERVATION PER HR: HCPCS

## 2022-05-01 PROCEDURE — 2709999900 HC NON-CHARGEABLE SUPPLY

## 2022-05-01 PROCEDURE — 74011636637 HC RX REV CODE- 636/637: Performed by: EMERGENCY MEDICINE

## 2022-05-01 PROCEDURE — 85025 COMPLETE CBC W/AUTO DIFF WBC: CPT

## 2022-05-01 PROCEDURE — 74011250636 HC RX REV CODE- 250/636: Performed by: HOSPITALIST

## 2022-05-01 PROCEDURE — 36415 COLL VENOUS BLD VENIPUNCTURE: CPT

## 2022-05-01 PROCEDURE — 96376 TX/PRO/DX INJ SAME DRUG ADON: CPT

## 2022-05-01 PROCEDURE — 74011000250 HC RX REV CODE- 250: Performed by: HOSPITALIST

## 2022-05-01 PROCEDURE — 99217 PR OBSERVATION CARE DISCHARGE MANAGEMENT: CPT | Performed by: EMERGENCY MEDICINE

## 2022-05-01 PROCEDURE — 80053 COMPREHEN METABOLIC PANEL: CPT

## 2022-05-01 RX ORDER — PREDNISONE 20 MG/1
40 TABLET ORAL
Status: DISCONTINUED | OUTPATIENT
Start: 2022-05-01 | End: 2022-05-01 | Stop reason: HOSPADM

## 2022-05-01 RX ORDER — PREDNISONE 10 MG/1
TABLET ORAL
Qty: 14 TABLET | Refills: 0 | OUTPATIENT
Start: 2022-05-01 | End: 2022-05-06

## 2022-05-01 RX ORDER — FAMOTIDINE 20 MG/1
20 TABLET, FILM COATED ORAL DAILY
Qty: 10 TABLET | Refills: 0 | Status: SHIPPED | OUTPATIENT
Start: 2022-05-01 | End: 2022-05-11

## 2022-05-01 RX ADMIN — SODIUM CHLORIDE, PRESERVATIVE FREE 10 ML: 5 INJECTION INTRAVENOUS at 05:58

## 2022-05-01 RX ADMIN — DEXAMETHASONE SODIUM PHOSPHATE 10 MG: 4 INJECTION, SOLUTION INTRAMUSCULAR; INTRAVENOUS at 00:32

## 2022-05-01 RX ADMIN — DEXAMETHASONE SODIUM PHOSPHATE 10 MG: 4 INJECTION, SOLUTION INTRAMUSCULAR; INTRAVENOUS at 05:55

## 2022-05-01 RX ADMIN — PREDNISONE 40 MG: 20 TABLET ORAL at 11:22

## 2022-05-01 RX ADMIN — FAMOTIDINE 20 MG: 10 INJECTION INTRAVENOUS at 08:40

## 2022-05-01 NOTE — PROGRESS NOTES
Pt O2 sat 97% on RA while walking. Discharge instructions and education provided. Questions and concerns addressed by RN.

## 2022-05-01 NOTE — PROGRESS NOTES
Patient is sitting in bed in no apparent distress, awake and alert. Patient states that her angioedema and facial swelling have resolved. Patient feels back to her baseline. Patient denies any shortness of breath. Patient is tolerating diet without any difficulty. Patient wishes to go home. Home today. I discussed discharge plans and medications with the patient and she verbally understanding and agreed. I counseled patient to avoid and not eat any shellfish. Patient verbalized understanding and agreed. I also counseled patient to avoid any IV contrast.  Patient verbalized understanding and agreed. Home today.   Discussed with RN

## 2022-05-01 NOTE — DISCHARGE SUMMARY
[address] [place] [address] Dr Sd Boyer [facility], [address]     DISCHARGE SUMMARY    Name: Kael Yun MRN: [id number]   Age / Sex: 75 y.o. / female CSN: [id number]   YOB: 1955 Length of Stay: 1 days   Admit Date: 4/30/2022 Discharge Date:        PRIMARY CARE PHYSICIAN: Tiffany Marques MD      DISCHARGE DIAGNOSES:    Angioedema  Hypertension    CONSULTS CALLED: ENT      PROCEDURES DONE: Flexible fiberoptic nasal laryngoscope E by ENT      [address]: This is a 25-year-old female with a past history of hypertension and allergy to shrimp who presented to the ED with tongue swelling. Patient gave history that she ate a shrimp and crab dip and since then developed the symptoms. Patient was evaluated and was admitted. Patient was started on IV steroids and Benadryl. ENT was consulted and evaluated the patient. ENT recommended overnight observation. Patient was monitored overnight. Patient swelling showed improvement. Patient was tolerating diet without any difficulty. Patient did not have any shortness of breath. Patient was mobilized. Patient wished to go home. Patient was counseled regarding avoiding seafood and IV contrast.  Patient verbalized understanding. Discharge plans were discussed with the patient. MEDICATIONS ON DISCHARGE:    Current Discharge Medication List      START taking these medications    Details   predniSONE (DELTASONE) 10 mg tablet 4 tabs ( 40 mg ) po daily for 2 days, then 2 tabs ( 20 mg ) po daily for 2 days, then 1 tab ( 10 mg ) po daily for 2 days, then stop  Qty: 14 Tablet, Refills: 0  Start date: 5/1/2022      famotidine (Pepcid) 20 mg tablet Take 1 Tablet by mouth daily for 10 days.   Qty: 10 Tablet, Refills: 0  Start date: 5/1/2022, End date: 5/11/2022      OTHER Check a cbc, cmp, mg in 4 days, results to PCP immediately, Diagnosis- angioedema  Qty: 1 Each, Refills: 0  Start date: 5/1/2022         CONTINUE these medications which have NOT CHANGED    Details   hydroCHLOROthiazide (MICROZIDE) 12.5 mg capsule Take 12.5 mg by mouth daily. DISCHARGE VITAL SIGNS:  Visit Vitals  BP (!) 152/78   Pulse 67   Temp 98.7 °F (37.1 °C)   Resp 18   Ht 5' 2\" (1.575 m)   Wt 88.9 kg (196 lb)   SpO2 98%   BMI 35.85 kg/m²           CONDITION ON DISCHARGE: Stable. DISPOSITION: Home      FOLLOW-UP RECOMMENDATIONS:   Follow-up Information     Follow up With Specialties Details Why Contact Info    Megan Najjar, MD Internal Medicine   [address] [id number] [phone]      Vera Conway MD Otolaryngology, Surgery   [address] [place] [address]  [phone]            OTHER INSTRUCTIONS:        TIME SPENT ON DISCHARGE ACTIVITIES: More than 35 minutes. Dragon medical dictation software was used for portions of this report. Unintended errors may occur.       Signed:  Markus Beck MD      5/1/2022

## 2022-05-01 NOTE — PROGRESS NOTES
Problem: Falls - Risk of  Goal: *Absence of Falls  Description: Document Dee Jeffery Fall Risk and appropriate interventions in the flowsheet.   Outcome: Progressing Towards Goal  Note: Fall Risk Interventions:            Medication Interventions: Patient to call before getting OOB                   Problem: Patient Education: Go to Patient Education Activity  Goal: Patient/Family Education  Outcome: Progressing Towards Goal

## 2022-05-01 NOTE — PROGRESS NOTES
Reason for Admission:  Angioedema [T78. 3XXA]                 RUR Score:    7%            Plan for utilizing home health:    No, not at this time. Patient said she is ambulatory, and self-care. Likelihood of Readmission:   LOW                         Transition of Care Plan:              Initial assessment completed with patient. Cognitive status of patient: oriented to time, place, person and situation. Face sheet information confirmed:  yes. The patient designates her sister Kary Boxer [phone] to participate in her discharge plan and to receive any needed information. This patient lives in a single family home with her daughter, and granddaughter, with 3 steps to enter. Patient is able to navigate steps as needed. Prior to hospitalization, patient was considered to be independent with ADLs/IADLS : yes . Patient has a current ACP document on file: no.       Healthcare Decision Maker:     Click here to complete [place] including selection of the Healthcare Decision Maker Relationship (ie \"Primary\")      The patient's sister or daughter will be available to transport patient home upon discharge. The patient already has none reported,  medical equipment available in the home. Patient is not currently active with home health. Patient has not stayed in a skilled nursing facility or rehab. This patient is on dialysis :no.          Currently, the discharge plan is Home with Family Assistance. The patient states that she can obtain her medications from the pharmacy, and take her medications as directed. Patient's current insurance is [employer]. Care Management Interventions  PCP Verified by CM:  Yes  Mode of Transport at Discharge: Self (Patient's sister or daughter will be transporting patient home at time of discharge.)  Discharge Durable Medical Equipment: No  Physical Therapy Consult: No  Occupational Therapy Consult: No  Speech Therapy Consult: No  Support Systems: Child(guillermo),Other Family Member(s) (Patient lives with her daughter and granddaughter.)  Confirm Follow Up Transport: Family  Discharge Location  Patient Expects to be Discharged to[de-identified] Home with family assistance        Ladan Chiu RN  Case Management [phone]

## 2022-05-01 NOTE — CONSULTS
[facility]    Name:  Domenico Ye  MR#:   [id number]  :  1955  ACCOUNT #:  [de-identified]  DATE OF SERVICE:  2022    REASON FOR CONSULTATION:  Angioedema/allergic reaction. HISTORY OF PRESENT ILLNESS:  The patient is a 59-year-old female with a past medical history significant for hypertension who has had a recent episode of angioedema approximately 1 month prior. The patient states that she has known angioedema to shellfish. Apparently, yesterday she was having dinner, had some shrimp as well as crab dip, fell asleep without difficulty. However, woke this morning with significant swelling of the tongue, floor of mouth as well as poor p.o. intake. The patient was seen at an urgent care; was placed on Solu-Medrol, Benadryl and epinephrine; was transported to [facility] where she received TXA and Pepcid. She is now in the emergency room at [facility] for difficulties with further management and admission. The patient denies any headaches, hearing loss, nausea, vomiting, lightheadedness, abdominal pain. She does admit to prior history as noted above. The patient states that since this morning, she has noticed some significant improvement. She states that she has also had improvement over the last 3-4 hours as well. PAST MEDICAL HISTORY:  Significant for hyperlipidemia, hypertension and vitamin D deficiency. PAST SURGICAL HISTORY:  Includes a diskectomy of L5, hysterectomy. SOCIAL HISTORY:  The patient denies any tobacco or alcohol use. ALLERGIES:  INCLUDE PENICILLIN AND SEAFOOD. REVIEW OF SYSTEMS:  Contributory for:  CONSTITUTIONAL:  Negative for chills. HEENT:  Negative for hearing loss or rhinorrhea. Eyes:  Negative for visual disturbance. RESPIRATORY:  Negative for shortness of breath. CARDIOVASCULAR:  Negative chest pain. GI:  Negative for abdominal pain. :  Negative for dysuria. MUSCULOSKELETAL:  Negative for myalgia.   SKIN: Negative for rash. NEUROLOGIC:  Negative for syncope. PHYSICAL EXAMINATION:  GENERAL:  Well-developed, well-nourished female with no evidence of any significant swelling in the neck. HEENT:  The intraoral exam reveals some swelling of the tongue itself. There is some swelling of the floor of mouth. I cannot fully see the posterior pharyngeal wall. The patient is mildly obese, does have a small mouth, hence I am uncertain even in the best of circumstances how much the oral cavity could have been noted. There is, however, mild degree of swelling of the floor of mouth. The intranasal exam reveals deviation in the nasal septum on the left side. CHEST:  Distant breath sounds. HEART:  S1, S2, no murmur audible. EXTREMITIES:  Within normal limits. NEUROLOGIC:  Grossly intact. Flexible fiberoptic nasolaryngoscopy was performed, and the patient's nasopharynx, oropharynx, hypopharynx and larynx were all inspected. There was no evidence of any laryngeal swelling. No evidence of any significant findings noted in the laryngeal setting. No other difficulties are seen. IMPRESSION:  Allergic reaction from use of a shellfish. PLAN:  Agree with present course of steroids. We will make certain that the patient is placed on Decadron q.6 hours. Would recommend patient monitoring overnight with probable discharge in a.m. if significant improvement is seen. This was discussed at length with the patient. I have also discussed at length the need for discontinuation of all shellfish as well as the avoidance of any IV contrast dye should this ever be necessary. The patient is aware and understands. Thank you for consult.       MD DOREEN [email or web address]  D:  04/30/2022 17:08  T:  04/30/2022 22:32  JOB #:  [id number]

## 2022-05-01 NOTE — PROGRESS NOTES
Carole Nieves with [employer]ization [employer] paged SANDRA. CM called and spoke with Carole Nieves with UR, said patient needs a Code 40. CM went to see patient, Code 44 explained, patient refused to sign, doesn't know what will will be covered with hospital stay, and said the doctors changed her to Observation not her. CM called and spoke with Carole Nieves with [employer]ization [employer], and updated her on patient's refusal to sign for Code 40, Medicare Observation form and [facility] Observation form.            Doug Merchant RN  Case Management [phone]

## 2022-05-01 NOTE — ROUTINE PROCESS
2000: Pt resting in bed. NAD. Denies SOB, itching, and oral swelling. 0730: Bedside and Verbal shift change report given to Shaw Frausto RN (oncoming nurse) by Jose Fritz RN (offgoing nurse). Report included the following information SBAR, Kardex, Intake/Output, Recent Results and Cardiac Rhythm NSR.

## 2022-05-01 NOTE — PROGRESS NOTES
D/C order noted for today. Orders reviewed. CM spoke with patient, her sister or daughter will be transporting her home today at time of discharge. No needs identified at this time. CM remains available if needed.            Jenny Dooley, TAMARA [phone]

## 2022-05-01 NOTE — DISCHARGE INSTRUCTIONS
Patient Education        Angioedema: Care Instructions  Overview     Angioedema is swelling in the deep layers of the skin. Angioedema can sometimes occur along with hives. Hives are an allergic reaction in the outer layers of the skin. Angioedema can range from mild to severe. Painful swelling can develop on the face. Angioedema can also occur on other parts of the body. In severe cases, the inside of the throat can swell and make it hard to breathe. Many things can cause this condition, including foods, insect bites, and medicines (such as aspirin and some blood pressure medicines). It also can run in families. Sometimes you may know what caused the reaction, but other times you may not know. Follow-up care is a key part of your treatment and safety. Be sure to make and go to all appointments, and call your doctor if you are having problems. It's also a good idea to know your test results and keep a list of the medicines you take. How can you care for yourself at home? · Take your medicines exactly as prescribed. Call your doctor if you think you are having a problem with your medicine. You will get more details on the specific medicines your doctor prescribes. Some medicines used to treat angioedema can make you too sleepy to drive safely. Do not drive if you take medicine that may make you sleepy. · Avoid foods or medicine that may have triggered the swelling. · For comfort:  ? Try taking a cool bath. Or place a cool, wet towel on the swollen area. ? Avoid hot baths and showers. ? Wear loose clothing. · Your doctor may prescribe a shot of epinephrine to carry with you in case you have a severe reaction. Learn how to give yourself the shot and keep it with you at all times. Make sure it has not . When should you call for help? Give an epinephrine shot if:    · You think you are having a severe allergic reaction. After giving an epinephrine shot call [phone], even if you feel better.   Call [phone] if:    · You have symptoms of a severe allergic reaction. These may include:  ? Sudden raised, red areas (hives) all over your body. ? Swelling of the throat, mouth, lips, or tongue. ? Trouble breathing. ? Passing out (losing consciousness). Or you may feel very lightheaded or suddenly feel weak, confused, or restless.     · You have been given an epinephrine shot, even if you feel better. Call your doctor now or seek immediate medical care if:    · You have symptoms of an allergic reaction, such as:  ? A rash or hives (raised, red areas on the skin). ? Itching. ? Swelling. ? Belly pain, nausea, or vomiting. Watch closely for changes in your health, and be sure to contact your doctor if:    · You do not get better as expected. Where can you learn more? Go to http://www.gray.com/  Enter [id number] in the search box to learn more about \"Angioedema: Care Instructions. \"  Current as of: February 10, 2021               Content Version: 13.2  © 2006-2022 [employer]. Care instructions adapted under license by [employer] (which disclaims liability or warranty for this information). If you have questions about a medical condition or this instruction, always ask your healthcare professional. [address] any warranty or liability for your use of this information.